# Patient Record
Sex: FEMALE | Race: WHITE
[De-identification: names, ages, dates, MRNs, and addresses within clinical notes are randomized per-mention and may not be internally consistent; named-entity substitution may affect disease eponyms.]

---

## 2020-09-09 ENCOUNTER — HOSPITAL ENCOUNTER (EMERGENCY)
Dept: HOSPITAL 4 - SED | Age: 35
Discharge: HOME | End: 2020-09-09
Payer: MEDICAID

## 2020-09-09 VITALS — BODY MASS INDEX: 44.3 KG/M2 | HEIGHT: 63 IN | WEIGHT: 250 LBS

## 2020-09-09 VITALS — SYSTOLIC BLOOD PRESSURE: 131 MMHG

## 2020-09-09 VITALS — SYSTOLIC BLOOD PRESSURE: 117 MMHG

## 2020-09-09 DIAGNOSIS — J45.909: ICD-10-CM

## 2020-09-09 DIAGNOSIS — L40.9: Primary | ICD-10-CM

## 2020-09-09 DIAGNOSIS — L03.811: ICD-10-CM

## 2020-09-09 NOTE — NUR
Patient given written and verbal discharge instructions and verbalizes 
understanding.  ER MD discussed with patient the results and treatment 
provided. Patient in stable condition. ID arm band removed. IV catheter removed 
intact and dressing applied, no active bleeding.

Patient educated on pain management and to follow up with PMD. Pain Scale 2/10

Opportunity for questions provided and answered. Medication side effect fact 
sheet provided.

## 2020-09-09 NOTE — NUR
CALM, ALERT, RESP UNLABORED, SKIN WARM AND DRY. 

MEDICATED AS ORDERED, C/O LUMPS AND PAIN TO BACK OF NECK FOR MONTHS